# Patient Record
Sex: MALE | Race: WHITE | NOT HISPANIC OR LATINO | URBAN - NONMETROPOLITAN AREA
[De-identification: names, ages, dates, MRNs, and addresses within clinical notes are randomized per-mention and may not be internally consistent; named-entity substitution may affect disease eponyms.]

---

## 2023-10-13 ENCOUNTER — OFFICE VISIT (OUTPATIENT)
Dept: URBAN - NONMETROPOLITAN AREA CLINIC 4 | Facility: CLINIC | Age: 33
End: 2023-10-13
Payer: COMMERCIAL

## 2023-10-13 VITALS
TEMPERATURE: 97.5 F | SYSTOLIC BLOOD PRESSURE: 141 MMHG | HEIGHT: 73 IN | BODY MASS INDEX: 34.67 KG/M2 | WEIGHT: 261.6 LBS | HEART RATE: 62 BPM | DIASTOLIC BLOOD PRESSURE: 93 MMHG

## 2023-10-13 DIAGNOSIS — Z83.2 FAMILY HISTORY OF AUTOIMMUNE DISORDER: ICD-10-CM

## 2023-10-13 DIAGNOSIS — K58.0 IRRITABLE BOWEL SYNDROME WITH DIARRHEA: ICD-10-CM

## 2023-10-13 DIAGNOSIS — R19.4 CHANGE IN BOWEL HABITS: ICD-10-CM

## 2023-10-13 DIAGNOSIS — K62.5 RECTAL BLEEDING: ICD-10-CM

## 2023-10-13 PROBLEM — 82423001: Status: ACTIVE | Noted: 2023-10-13

## 2023-10-13 PROCEDURE — 99244 OFF/OP CNSLTJ NEW/EST MOD 40: CPT | Performed by: PHYSICIAN ASSISTANT

## 2023-10-13 PROCEDURE — 99204 OFFICE O/P NEW MOD 45 MIN: CPT | Performed by: PHYSICIAN ASSISTANT

## 2023-10-13 RX ORDER — POLYETHYLENE GLYCOL 3350, SODIUM SULFATE, SODIUM CHLORIDE, POTASSIUM CHLORIDE, ASCORBIC ACID, SODIUM ASCORBATE 140-9-5.2G
140ML KIT ORAL ONCE
Qty: 1 | Refills: 0 | OUTPATIENT
Start: 2023-10-13 | End: 2023-10-14

## 2023-10-13 RX ORDER — ROSUVASTATIN CALCIUM 20 MG/1
1 TABLET TABLET, FILM COATED ORAL ONCE A DAY
Status: ACTIVE | COMMUNITY

## 2023-10-13 NOTE — PHYSICAL EXAM GASTROINTESTINAL
Abdomen,  soft, nontender, nondistended,  normal bowel sounds,  Liver and Spleen,  no hepatomegaly present  obese

## 2023-10-13 NOTE — HPI-TODAY'S VISIT:
Mr. Flaco NOLASCO is a pleasant male with PMHX of back pain from herniated disks at the age of 14 with need for furgery and spinal stuimulator who presents today for GI complaints  he states he first noticed some change in his bowels after his first back surgery, where at that time he was using pain medication and would notice alternating diarrhea with Constipation.  He would intermittently have no symptoms and attributed some of his change in bowel habits to his poor diet with fast food, however since early 2023 he has noted consistent bright red blood per rectum and diarrhea  he denies any real abdominal pain, but does have some left lower quadrant soreness associated with his change in bowel habits  he does have a sister with autoimmune disease although he is unsure of her official diagnosis  his mother is diagnosed with irritable bowel syndrome. He denies any family history of non colon cancer or colon polyps. No family history of IBD

## 2023-10-16 ENCOUNTER — ERX REFILL RESPONSE (OUTPATIENT)
Dept: URBAN - NONMETROPOLITAN AREA CLINIC 4 | Facility: CLINIC | Age: 33
End: 2023-10-16

## 2023-10-16 RX ORDER — POLYETHYLENE GLYCOL 3350, SODIUM SULFATE, SODIUM CHLORIDE, POTASSIUM CHLORIDE, ASCORBIC ACID, SODIUM ASCORBATE 140-9-5.2G
140ML KIT ORAL ONCE
Qty: 1 | Refills: 0 | OUTPATIENT

## 2023-11-06 ENCOUNTER — OUT OF OFFICE VISIT (OUTPATIENT)
Dept: URBAN - METROPOLITAN AREA SURGERY CENTER 14 | Facility: SURGERY CENTER | Age: 33
End: 2023-11-06
Payer: COMMERCIAL

## 2023-11-06 DIAGNOSIS — K92.1 ACUTE MELENA: ICD-10-CM

## 2023-11-06 DIAGNOSIS — R19.7 ACUTE DIARRHEA: ICD-10-CM

## 2023-11-06 DIAGNOSIS — R19.7 DIARRHEA: ICD-10-CM

## 2023-11-06 DIAGNOSIS — K57.30 DIVERTICULOSIS: ICD-10-CM

## 2023-11-06 DIAGNOSIS — D12.5 ADENOMA OF SIGMOID COLON: ICD-10-CM

## 2023-11-06 DIAGNOSIS — K63.5 COLONIC POLYPS: ICD-10-CM

## 2023-11-06 DIAGNOSIS — K64.8 OTHER HEMORRHOIDS: ICD-10-CM

## 2023-11-06 DIAGNOSIS — K63.89 APPENDICITIS EPIPLOICA: ICD-10-CM

## 2023-11-06 PROCEDURE — 45380 COLONOSCOPY AND BIOPSY: CPT | Performed by: INTERNAL MEDICINE

## 2023-11-06 PROCEDURE — 00811 ANES LWR INTST NDSC NOS: CPT

## 2023-11-06 PROCEDURE — 45385 COLONOSCOPY W/LESION REMOVAL: CPT | Performed by: INTERNAL MEDICINE

## 2023-11-06 PROCEDURE — G8907 PT DOC NO EVENTS ON DISCHARG: HCPCS | Performed by: INTERNAL MEDICINE

## 2023-11-06 RX ORDER — ROSUVASTATIN CALCIUM 20 MG/1
1 TABLET TABLET, FILM COATED ORAL ONCE A DAY
Status: ACTIVE | COMMUNITY

## 2023-11-06 RX ORDER — POLYETHYLENE GLYCOL 3350, SODIUM SULFATE, SODIUM CHLORIDE, POTASSIUM CHLORIDE, ASCORBIC ACID, SODIUM ASCORBATE 140-9-5.2G
140ML KIT ORAL ONCE
Qty: 1 | Refills: 0 | Status: ACTIVE | COMMUNITY

## 2023-12-05 ENCOUNTER — DASHBOARD ENCOUNTERS (OUTPATIENT)
Age: 33
End: 2023-12-05

## 2023-12-05 ENCOUNTER — OFFICE VISIT (OUTPATIENT)
Dept: URBAN - NONMETROPOLITAN AREA CLINIC 4 | Facility: CLINIC | Age: 33
End: 2023-12-05
Payer: COMMERCIAL

## 2023-12-05 VITALS
SYSTOLIC BLOOD PRESSURE: 113 MMHG | DIASTOLIC BLOOD PRESSURE: 81 MMHG | BODY MASS INDEX: 34.78 KG/M2 | HEART RATE: 66 BPM | HEIGHT: 73 IN | WEIGHT: 262.4 LBS | TEMPERATURE: 96.4 F

## 2023-12-05 DIAGNOSIS — M54.50 LOW BACK PAIN, UNSPECIFIED: ICD-10-CM

## 2023-12-05 DIAGNOSIS — R19.4 CHANGE IN BOWEL HABITS: ICD-10-CM

## 2023-12-05 DIAGNOSIS — K62.5 RECTAL BLEEDING: ICD-10-CM

## 2023-12-05 DIAGNOSIS — K58.0 IRRITABLE BOWEL SYNDROME WITH DIARRHEA: ICD-10-CM

## 2023-12-05 DIAGNOSIS — G89.29 OTHER CHRONIC PAIN: ICD-10-CM

## 2023-12-05 DIAGNOSIS — K21.9 GASTROESOPHAGEAL REFLUX DISEASE, UNSPECIFIED WHETHER ESOPHAGITIS PRESENT: ICD-10-CM

## 2023-12-05 DIAGNOSIS — Z83.2 FAMILY HISTORY OF AUTOIMMUNE DISORDER: ICD-10-CM

## 2023-12-05 PROBLEM — 235595009: Status: ACTIVE | Noted: 2023-12-05

## 2023-12-05 PROBLEM — 197125005: Status: ACTIVE | Noted: 2023-10-13

## 2023-12-05 PROCEDURE — 99214 OFFICE O/P EST MOD 30 MIN: CPT | Performed by: PHYSICIAN ASSISTANT

## 2023-12-05 RX ORDER — PANTOPRAZOLE SODIUM 40 MG/1
1 TABLET TABLET, DELAYED RELEASE ORAL ONCE A DAY
Qty: 30 | OUTPATIENT
Start: 2023-12-05

## 2023-12-05 RX ORDER — ROSUVASTATIN CALCIUM 20 MG/1
1 TABLET TABLET, FILM COATED ORAL ONCE A DAY
Status: ACTIVE | COMMUNITY

## 2023-12-05 NOTE — HPI-TODAY'S VISIT:
Mr. Flaco NOLASCO is a pleasant male with PMHX of back pain from herniated disks at the age of 14 with need for furgery and spinal stuimulator who presents today for GI complaints  he states he first noticed some change in his bowels after his first back surgery, where at that time he was using pain medication and would notice alternating diarrhea with Constipation.  He would intermittently have no symptoms and attributed some of his change in bowel habits to his poor diet with fast food, however since early 2023 he has noted consistent bright red blood per rectum and diarrhea  he denies any real abdominal pain, but does have some left lower quadrant soreness associated with his change in bowel habits  he does have a sister with autoimmune disease although he is unsure of her official diagnosis  his mother is diagnosed with irritable bowel syndrome. He denies any family history of non colon cancer or colon polyps. No family history of IBD  12.5.23 S/P [facility] with 1 small TA noted in sigmoid colon, otherwise diverticulosis and normal colon pathology.  Small internal hemorrhoids.  Today states no abd pain, does still feel like intermittent bowel issues with alternating diarrhea and constipation.  Scan BRBPR occasionally, but does strain for periods of time.  GERD intermittently as well, but also has poor eating habits

## 2024-03-05 ENCOUNTER — OV EP (OUTPATIENT)
Dept: URBAN - NONMETROPOLITAN AREA CLINIC 4 | Facility: CLINIC | Age: 34
End: 2024-03-05